# Patient Record
Sex: MALE
[De-identification: names, ages, dates, MRNs, and addresses within clinical notes are randomized per-mention and may not be internally consistent; named-entity substitution may affect disease eponyms.]

---

## 2017-07-31 ENCOUNTER — HOSPITAL ENCOUNTER (OUTPATIENT)
Dept: HOSPITAL 42 - SDS | Age: 67
Discharge: HOME | End: 2017-07-31
Attending: UROLOGY
Payer: MEDICARE

## 2017-07-31 VITALS
DIASTOLIC BLOOD PRESSURE: 92 MMHG | HEART RATE: 60 BPM | RESPIRATION RATE: 18 BRPM | SYSTOLIC BLOOD PRESSURE: 159 MMHG | TEMPERATURE: 98 F

## 2017-07-31 VITALS — OXYGEN SATURATION: 96 %

## 2017-07-31 VITALS — BODY MASS INDEX: 34.1 KG/M2

## 2017-07-31 DIAGNOSIS — L80: ICD-10-CM

## 2017-07-31 DIAGNOSIS — N35.9: Primary | ICD-10-CM

## 2017-07-31 DIAGNOSIS — N32.3: ICD-10-CM

## 2017-07-31 PROCEDURE — 52281 CYSTOSCOPY AND TREATMENT: CPT

## 2017-07-31 NOTE — OP
PROCEDURE DATE:  07/31/2017



PREOPERATIVE DIAGNOSIS:  Urethral strictures.



POSTOPERATIVE DIAGNOSIS:  Urethral strictures.



PROCEDURE:  Urethral dilation with sounds and cystoscopy and Edwards catheter

insertion.



ATTENDING SURGEON:  Dr. Santos Javier.



ANESTHESIA:  General.



SPECIMENS:  None.



DRAINS:  An 18-Grenadian 2-way Edwards catheter.



COMPLICATIONS:  None.



OPERATIVE FINDINGS:  After informed consent was obtained, the patient was

taken to the operating room and placed on the operating table.  Anesthesia

was administered.  The patient had received intravenous antibiotics prior

to start of the procedure.  On exam, the patient has a tight urethral

meatus almost pinpoint.  The patient has a history of Tommy's gangrene. 

He has also vitiligo of the glans.  At this point, male urethral sounds

were obtained.  The urethral meatus was then dilated from 12-Grenadian sound

to a 26-Grenadian sound.  There was some tearing at the 6 o'clock position. 

At this point, after this dilation of the meatus a 22-Grenadian cystoscope was

obtained.  It was placed into the meatus.  There was a second stricture

noted in the fossa navicularis which had been dilated with the sounds.  The

cystoscope was advanced proximally in the mid penile urethra.  There are

multiple annular rings with a whitish plaque on it noted.  The cystoscope

was able to be manipulated through these further strictures and into the

bulbar urethra.  There was a large amount of inflammation noted in the

membranous urethra.  The prostate appeared to have enlarged lateral lobes

with enlarged median lobe and high bladder neck.  The scope was able to

passed into the bladder and a full survey inspection was performed.  There

were no stones, papillary tumors or foreign bodies noted.  There was grade

2 trabeculation with small bladder diverticulum noted on the

superior-posterior wall.  Both urethral orifices were visualized and

appeared within normal limits.  At this point, the cystoscope was then

withdrawn under direct vision.  The bladder was left full and an 18-Grenadian

2-way Edwards catheter was then able to be passed into the bladder at least

to straight drainage.  The plan will be to continue antibiotics for

patient's urinary infection.  The patient is instructed to use steroid

cream on the vitiligo around the urethral meatus.  He will be discharged

with a Edwards catheter which will be removed in my office and he will

continue followup there.  The patient tolerated the procedure well.  The

patient was returned to the supine position and taken to the recovery room

awake in stable condition.







__________________________________________

Santos Javier MD





DD:  07/31/2017 12:21:37

DT:  07/31/2017 15:51:16

Job # [id number]

## 2018-09-06 ENCOUNTER — HOSPITAL ENCOUNTER (OUTPATIENT)
Dept: HOSPITAL 42 - SDS | Age: 68
Discharge: HOME | End: 2018-09-06
Attending: UROLOGY
Payer: MEDICARE

## 2018-09-06 VITALS
HEART RATE: 61 BPM | TEMPERATURE: 97.8 F | SYSTOLIC BLOOD PRESSURE: 131 MMHG | RESPIRATION RATE: 20 BRPM | DIASTOLIC BLOOD PRESSURE: 64 MMHG

## 2018-09-06 VITALS — BODY MASS INDEX: 34.1 KG/M2

## 2018-09-06 VITALS — OXYGEN SATURATION: 95 %

## 2018-09-06 DIAGNOSIS — N32.3: ICD-10-CM

## 2018-09-06 DIAGNOSIS — N48.0: ICD-10-CM

## 2018-09-06 DIAGNOSIS — N35.9: Primary | ICD-10-CM

## 2018-09-06 PROCEDURE — 52281 CYSTOSCOPY AND TREATMENT: CPT

## 2018-09-06 NOTE — OP
PROCEDURE DATE:  09/06/2018



PREOPERATIVE DIAGNOSES:  Urethral strictures, balanitis xerotica

obliterans.



POSTOPERATIVE DIAGNOSES:  Urethral strictures, balanitis xerotica

obliterans.



PROCEDURE:  A cystoscopy, urethral dilation with sounds.



ATTENDING SURGEON:  Santos Javier MD.



ANESTHESIA:  General.



SPECIMENS:  There were none.



DRAINS:  A 20-Yemeni two-way Edwards catheter.



COMPLICATIONS:  There were none.



OPERATIVE FINDINGS:  After informed consent was obtained, the patient was

taken to the operating room, placed on the operating table.  Anesthesia was

administered.  The patient was placed in dorsal lithotomy position and

prepped and draped in the usual sterile fashion.  The patient received IV

antibiotics prior to start of the procedure.  On exam, the patient had

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

glans.  The urethral meatus was visualized.  It appeared somewhat tight. 

The patient has a history of Tommy's gangrene in the past and has had a

meatoplasty with recurrent submeatal stenosis.  At this point, using curved

Tellez sounds, the urethra was then dilated.  Initially, it was difficult to

pass a 16-Yemeni sound and dilation was then started with 12-Yemeni sound. 

The urethra was easily dilated with the sounds up to a size of 28-Yemeni. 

After the distal urethra had been dilated, a cystoscopy was performed.  A

22-Yemeni scope was placed in the patient's urethra and advanced proximally

under direct vision.  There were multiple narrowings and strictures with

inflammation noted throughout the urethra.  The bulbar urethra appeared

opened.  The prostatic urethra was opened.  The prostate was mildly

enlarged with lateral lobes; however, there did not appear to be prostatic

occlusion of the bladder neck.  The bladder was entered.  A full survey

inspection was performed.  There were no stones, tumors or foreign bodies

noted.  There was grade 1 to 2 trabeculation noted.  There were multiple

cellules noted.  There was a diverticulum noted at the dome and one on the

superior posterior wall.  There were no other abnormal findings in the

bladder.  Both ureteral orifices were visualized and appeared within normal

limits.  At this point, the scope was withdrawn under direct vision.  The

previously noted strictures were opened in the proximal pendulous urethra. 

The more serious strictures appeared to be in the distal penile urethra. 

These were opened with the sounds and dilation.  The scope was then removed

and a 20-Yemeni two-way Edwards catheter was passed and placed to straight

bladder drainage.  The patient tolerated the procedure well.  He was

returned to the supine position and taken to the recovery room awake in

stable condition.





__________________________________________

Santos Javier MD



DD:  09/06/2018 10:13:54

DT:  09/06/2018 10:15:58

Job # [id number]

## 2021-05-26 PROBLEM — H25.11 NS CATARACT: Noted: 2021-05-26

## 2021-05-26 PROBLEM — H04.123 DRY EYE SYNDROME: Noted: 2021-05-26

## 2021-07-21 ENCOUNTER — PREPPED CHART (OUTPATIENT)
Dept: URBAN - METROPOLITAN AREA CLINIC 21 | Facility: CLINIC | Age: 71
End: 2021-07-21

## 2021-07-21 PROBLEM — H04.123 DRY EYE SYNDROME: Noted: 2021-05-26

## 2021-07-21 PROBLEM — H25.11 NS CATARACT: Noted: 2021-05-26

## 2021-07-21 PROBLEM — H25.13 NS CATARACT: Noted: 2021-05-26

## 2021-07-21 PROBLEM — H25.812 COMBINED SENILE CATARACT: Noted: 2021-07-15

## 2021-07-21 PROBLEM — Z98.42 POSTOP CATARACT: Noted: 2021-07-21

## 2021-08-28 ENCOUNTER — 1 MONTH POST-OP (OUTPATIENT)
Dept: URBAN - METROPOLITAN AREA CLINIC 21 | Facility: CLINIC | Age: 71
End: 2021-08-28

## 2021-08-28 DIAGNOSIS — Z98.42: ICD-10-CM

## 2021-08-28 PROCEDURE — 99024 POSTOP FOLLOW-UP VISIT: CPT

## 2021-08-28 ASSESSMENT — TONOMETRY
OS_IOP_MMHG: 15
OD_IOP_MMHG: 16

## 2021-08-28 ASSESSMENT — KERATOMETRY
OS_K1POWER_DIOPTERS: 14.75
OS_AXISANGLE2_DEGREES: 173
OS_AXISANGLE_DEGREES: 83
OS_K2POWER_DIOPTERS: 173

## 2021-08-28 ASSESSMENT — VISUAL ACUITY
OD_SC: 20/50-3
OS_SC: 20/40-1

## 2022-02-09 ENCOUNTER — ESTABLISHED COMPREHENSIVE EXAM (OUTPATIENT)
Dept: URBAN - METROPOLITAN AREA CLINIC 21 | Facility: CLINIC | Age: 72
End: 2022-02-09

## 2022-02-09 DIAGNOSIS — H25.11: ICD-10-CM

## 2022-02-09 DIAGNOSIS — H04.123: ICD-10-CM

## 2022-02-09 DIAGNOSIS — Z96.1: ICD-10-CM

## 2022-02-09 PROCEDURE — 92014 COMPRE OPH EXAM EST PT 1/>: CPT

## 2022-02-09 PROCEDURE — 92136 OPHTHALMIC BIOMETRY: CPT

## 2022-02-09 ASSESSMENT — VISUAL ACUITY
OS_PH: 20/25
OS_SC: 20/30
OD_SC: 20/50-3

## 2022-02-09 ASSESSMENT — KERATOMETRY
OS_K2POWER_DIOPTERS: 173
OS_AXISANGLE_DEGREES: 83
OS_AXISANGLE2_DEGREES: 173
OS_K1POWER_DIOPTERS: 14.75

## 2022-02-09 ASSESSMENT — TONOMETRY
OS_IOP_MMHG: 18
OD_IOP_MMHG: 18

## 2022-04-20 ENCOUNTER — SURGERY/PROCEDURE (OUTPATIENT)
Dept: URBAN - METROPOLITAN AREA SURGICAL CENTER 32 | Facility: SURGICAL CENTER | Age: 72
End: 2022-04-20

## 2022-04-20 DIAGNOSIS — H25.11: ICD-10-CM

## 2022-04-20 PROCEDURE — 66984 XCAPSL CTRC RMVL W/O ECP: CPT

## 2022-04-20 ASSESSMENT — KERATOMETRY
OS_AXISANGLE2_DEGREES: 173
OS_K1POWER_DIOPTERS: 14.75
OS_K2POWER_DIOPTERS: 173
OS_AXISANGLE_DEGREES: 83

## 2022-04-21 ENCOUNTER — 1 DAY POST-OP (OUTPATIENT)
Dept: URBAN - METROPOLITAN AREA CLINIC 110 | Facility: CLINIC | Age: 72
End: 2022-04-21

## 2022-04-21 DIAGNOSIS — Z96.1: ICD-10-CM

## 2022-04-21 DIAGNOSIS — H04.123: ICD-10-CM

## 2022-04-21 PROCEDURE — 99024 POSTOP FOLLOW-UP VISIT: CPT

## 2022-04-21 ASSESSMENT — KERATOMETRY
OS_K1POWER_DIOPTERS: 14.75
OS_K2POWER_DIOPTERS: 173
OS_AXISANGLE_DEGREES: 83
OS_AXISANGLE2_DEGREES: 173

## 2022-04-21 ASSESSMENT — VISUAL ACUITY: OD_SC: 20/25-2

## 2022-04-21 ASSESSMENT — TONOMETRY: OD_IOP_MMHG: 15

## 2022-04-27 ENCOUNTER — 1 WEEK POST-OP (OUTPATIENT)
Dept: URBAN - METROPOLITAN AREA CLINIC 21 | Facility: CLINIC | Age: 72
End: 2022-04-27

## 2022-04-27 DIAGNOSIS — Z96.1: ICD-10-CM

## 2022-04-27 DIAGNOSIS — H04.123: ICD-10-CM

## 2022-04-27 PROCEDURE — 99024 POSTOP FOLLOW-UP VISIT: CPT

## 2022-04-27 ASSESSMENT — KERATOMETRY
OS_AXISANGLE_DEGREES: 83
OS_K2POWER_DIOPTERS: 173
OS_K1POWER_DIOPTERS: 14.75
OS_AXISANGLE2_DEGREES: 173

## 2022-04-27 ASSESSMENT — VISUAL ACUITY: OD_SC: 20/25

## 2022-05-18 ENCOUNTER — 1 MONTH POST-OP (OUTPATIENT)
Dept: URBAN - METROPOLITAN AREA CLINIC 21 | Facility: CLINIC | Age: 72
End: 2022-05-18

## 2022-05-18 DIAGNOSIS — Z96.1: ICD-10-CM

## 2022-05-18 PROCEDURE — 99024 POSTOP FOLLOW-UP VISIT: CPT

## 2022-05-18 ASSESSMENT — VISUAL ACUITY
OS_SC: J10
OD_SC: J16
OD_SC: 20/40+3
OS_SC: 20/50+1
OU_SC: 20/20-2

## 2022-05-18 ASSESSMENT — KERATOMETRY
OS_K2POWER_DIOPTERS: 173
OS_AXISANGLE2_DEGREES: 173
OS_K1POWER_DIOPTERS: 14.75
OS_AXISANGLE_DEGREES: 83

## 2022-05-18 ASSESSMENT — TONOMETRY
OD_IOP_MMHG: 13
OS_IOP_MMHG: 15

## 2023-08-15 ENCOUNTER — ESTABLISHED COMPREHENSIVE EXAM (OUTPATIENT)
Dept: URBAN - METROPOLITAN AREA CLINIC 21 | Facility: CLINIC | Age: 73
End: 2023-08-15

## 2023-08-15 DIAGNOSIS — H04.123: ICD-10-CM

## 2023-08-15 DIAGNOSIS — Z96.1: ICD-10-CM

## 2023-08-15 DIAGNOSIS — H35.033: ICD-10-CM

## 2023-08-15 DIAGNOSIS — H52.7: ICD-10-CM

## 2023-08-15 PROCEDURE — 92014 COMPRE OPH EXAM EST PT 1/>: CPT

## 2023-08-15 ASSESSMENT — VISUAL ACUITY
OS_SC: 20/30-2
OD_SC: 20/25-1
OU_SC: J5

## 2023-08-15 ASSESSMENT — TONOMETRY
OS_IOP_MMHG: 12
OD_IOP_MMHG: 11